# Patient Record
Sex: FEMALE | Race: AMERICAN INDIAN OR ALASKA NATIVE | NOT HISPANIC OR LATINO | URBAN - METROPOLITAN AREA
[De-identification: names, ages, dates, MRNs, and addresses within clinical notes are randomized per-mention and may not be internally consistent; named-entity substitution may affect disease eponyms.]

---

## 2017-08-25 ENCOUNTER — OUTPATIENT (OUTPATIENT)
Dept: OUTPATIENT SERVICES | Facility: HOSPITAL | Age: 42
LOS: 1 days | Discharge: HOME | End: 2017-08-25

## 2017-08-25 DIAGNOSIS — M67.432 GANGLION, LEFT WRIST: ICD-10-CM

## 2017-08-25 DIAGNOSIS — Z01.818 ENCOUNTER FOR OTHER PREPROCEDURAL EXAMINATION: ICD-10-CM

## 2017-08-31 ENCOUNTER — OUTPATIENT (OUTPATIENT)
Dept: OUTPATIENT SERVICES | Facility: HOSPITAL | Age: 42
LOS: 1 days | Discharge: HOME | End: 2017-08-31

## 2017-09-05 DIAGNOSIS — M67.432 GANGLION, LEFT WRIST: ICD-10-CM

## 2018-07-05 ENCOUNTER — EMERGENCY (EMERGENCY)
Facility: HOSPITAL | Age: 43
LOS: 0 days | Discharge: HOME | End: 2018-07-05
Attending: EMERGENCY MEDICINE | Admitting: EMERGENCY MEDICINE

## 2018-07-05 VITALS
SYSTOLIC BLOOD PRESSURE: 168 MMHG | TEMPERATURE: 98 F | RESPIRATION RATE: 16 BRPM | DIASTOLIC BLOOD PRESSURE: 98 MMHG | OXYGEN SATURATION: 100 % | HEART RATE: 83 BPM

## 2018-07-05 VITALS
HEIGHT: 66 IN | OXYGEN SATURATION: 100 % | RESPIRATION RATE: 17 BRPM | WEIGHT: 175.05 LBS | TEMPERATURE: 99 F | HEART RATE: 92 BPM | SYSTOLIC BLOOD PRESSURE: 175 MMHG | DIASTOLIC BLOOD PRESSURE: 102 MMHG
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
## 2018-07-05 DIAGNOSIS — M54.9 DORSALGIA, UNSPECIFIED: ICD-10-CM
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
## 2018-07-05 DIAGNOSIS — M54.5 LOW BACK PAIN: ICD-10-CM

## 2018-07-05 RX ORDER — IBUPROFEN 200 MG
600 TABLET ORAL ONCE
Qty: 0 | Refills: 0 | Status: COMPLETED | OUTPATIENT
Start: 2018-07-05 | End: 2018-07-05

## 2018-07-05 RX ADMIN — Medication 600 MILLIGRAM(S): at 21:06
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
## 2018-07-05 NOTE — ED PROVIDER NOTE - NS ED ROS FT
Review of Systems    Constitutional: (-) fever or chills  respiratory: (-) cough (-) shortness of breath  Cardiovascular: (-) syncope, palpitations or chest pain  Integumentary: (-) rash or painful lymph nodes  Neurological: (-) altered mental status, headache or head injury  msk: no cervical or vertebral tenderness/ good rom flexion and extension of back / no cva tenderness
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
## 2019-02-01 ENCOUNTER — OUTPATIENT (OUTPATIENT)
Dept: OUTPATIENT SERVICES | Facility: HOSPITAL | Age: 44
LOS: 1 days | Discharge: HOME | End: 2019-02-01

## 2019-02-01 DIAGNOSIS — Z00.00 ENCOUNTER FOR GENERAL ADULT MEDICAL EXAMINATION WITHOUT ABNORMAL FINDINGS: ICD-10-CM

## 2019-07-09 ENCOUNTER — EMERGENCY (EMERGENCY)
Facility: HOSPITAL | Age: 44
LOS: 0 days | Discharge: HOME | End: 2019-07-09
Attending: EMERGENCY MEDICINE | Admitting: EMERGENCY MEDICINE
Payer: COMMERCIAL

## 2019-07-09 VITALS
DIASTOLIC BLOOD PRESSURE: 117 MMHG | SYSTOLIC BLOOD PRESSURE: 193 MMHG | HEART RATE: 82 BPM | HEIGHT: 66 IN | WEIGHT: 156.09 LBS | TEMPERATURE: 96 F | OXYGEN SATURATION: 100 %

## 2019-07-09 VITALS
RESPIRATION RATE: 18 BRPM | HEART RATE: 78 BPM | SYSTOLIC BLOOD PRESSURE: 159 MMHG | OXYGEN SATURATION: 99 % | DIASTOLIC BLOOD PRESSURE: 92 MMHG

## 2019-07-09 DIAGNOSIS — R42 DIZZINESS AND GIDDINESS: ICD-10-CM

## 2019-07-09 DIAGNOSIS — F41.9 ANXIETY DISORDER, UNSPECIFIED: ICD-10-CM

## 2019-07-09 LAB
ALBUMIN SERPL ELPH-MCNC: 4.5 G/DL — SIGNIFICANT CHANGE UP (ref 3.5–5.2)
ALP SERPL-CCNC: 51 U/L — SIGNIFICANT CHANGE UP (ref 30–115)
ALT FLD-CCNC: 10 U/L — SIGNIFICANT CHANGE UP (ref 0–41)
ANION GAP SERPL CALC-SCNC: 6 MMOL/L — LOW (ref 7–14)
APAP SERPL-MCNC: <5 UG/ML — LOW (ref 10–30)
APPEARANCE UR: CLEAR — SIGNIFICANT CHANGE UP
AST SERPL-CCNC: 15 U/L — SIGNIFICANT CHANGE UP (ref 0–41)
BASOPHILS # BLD AUTO: 0.06 K/UL — SIGNIFICANT CHANGE UP (ref 0–0.2)
BASOPHILS NFR BLD AUTO: 1.1 % — HIGH (ref 0–1)
BILIRUB SERPL-MCNC: 0.5 MG/DL — SIGNIFICANT CHANGE UP (ref 0.2–1.2)
BILIRUB UR-MCNC: NEGATIVE — SIGNIFICANT CHANGE UP
BUN SERPL-MCNC: 9 MG/DL — LOW (ref 10–20)
CALCIUM SERPL-MCNC: 9.1 MG/DL — SIGNIFICANT CHANGE UP (ref 8.5–10.1)
CHLORIDE SERPL-SCNC: 117 MMOL/L — HIGH (ref 98–110)
CO2 SERPL-SCNC: 26 MMOL/L — SIGNIFICANT CHANGE UP (ref 17–32)
COLOR SPEC: YELLOW — SIGNIFICANT CHANGE UP
CREAT SERPL-MCNC: 0.7 MG/DL — SIGNIFICANT CHANGE UP (ref 0.7–1.5)
DIFF PNL FLD: NEGATIVE — SIGNIFICANT CHANGE UP
EOSINOPHIL # BLD AUTO: 0.03 K/UL — SIGNIFICANT CHANGE UP (ref 0–0.7)
EOSINOPHIL NFR BLD AUTO: 0.5 % — SIGNIFICANT CHANGE UP (ref 0–8)
ETHANOL SERPL-MCNC: <10 MG/DL — SIGNIFICANT CHANGE UP
GLUCOSE SERPL-MCNC: 118 MG/DL — HIGH (ref 70–99)
GLUCOSE UR QL: NEGATIVE MG/DL — SIGNIFICANT CHANGE UP
HCT VFR BLD CALC: 41.4 % — SIGNIFICANT CHANGE UP (ref 37–47)
HGB BLD-MCNC: 13.5 G/DL — SIGNIFICANT CHANGE UP (ref 12–16)
IMM GRANULOCYTES NFR BLD AUTO: 0.4 % — HIGH (ref 0.1–0.3)
KETONES UR-MCNC: NEGATIVE — SIGNIFICANT CHANGE UP
LEUKOCYTE ESTERASE UR-ACNC: NEGATIVE — SIGNIFICANT CHANGE UP
LYMPHOCYTES # BLD AUTO: 1.41 K/UL — SIGNIFICANT CHANGE UP (ref 1.2–3.4)
LYMPHOCYTES # BLD AUTO: 25.8 % — SIGNIFICANT CHANGE UP (ref 20.5–51.1)
MCHC RBC-ENTMCNC: 30.1 PG — SIGNIFICANT CHANGE UP (ref 27–31)
MCHC RBC-ENTMCNC: 32.6 G/DL — SIGNIFICANT CHANGE UP (ref 32–37)
MCV RBC AUTO: 92.2 FL — SIGNIFICANT CHANGE UP (ref 81–99)
MONOCYTES # BLD AUTO: 0.33 K/UL — SIGNIFICANT CHANGE UP (ref 0.1–0.6)
MONOCYTES NFR BLD AUTO: 6 % — SIGNIFICANT CHANGE UP (ref 1.7–9.3)
NEUTROPHILS # BLD AUTO: 3.61 K/UL — SIGNIFICANT CHANGE UP (ref 1.4–6.5)
NEUTROPHILS NFR BLD AUTO: 66.2 % — SIGNIFICANT CHANGE UP (ref 42.2–75.2)
NITRITE UR-MCNC: NEGATIVE — SIGNIFICANT CHANGE UP
NRBC # BLD: 0 /100 WBCS — SIGNIFICANT CHANGE UP (ref 0–0)
PH UR: 5.5 — SIGNIFICANT CHANGE UP (ref 5–8)
PLATELET # BLD AUTO: 170 K/UL — SIGNIFICANT CHANGE UP (ref 130–400)
POTASSIUM SERPL-MCNC: 4.8 MMOL/L — SIGNIFICANT CHANGE UP (ref 3.5–5)
POTASSIUM SERPL-SCNC: 4.8 MMOL/L — SIGNIFICANT CHANGE UP (ref 3.5–5)
PROT SERPL-MCNC: 6.6 G/DL — SIGNIFICANT CHANGE UP (ref 6–8)
PROT UR-MCNC: NEGATIVE MG/DL — SIGNIFICANT CHANGE UP
RBC # BLD: 4.49 M/UL — SIGNIFICANT CHANGE UP (ref 4.2–5.4)
RBC # FLD: 11.8 % — SIGNIFICANT CHANGE UP (ref 11.5–14.5)
SALICYLATES SERPL-MCNC: <0.3 MG/DL — LOW (ref 4–30)
SODIUM SERPL-SCNC: 149 MMOL/L — HIGH (ref 135–146)
SP GR SPEC: 1.02 — SIGNIFICANT CHANGE UP (ref 1.01–1.03)
UROBILINOGEN FLD QL: 0.2 MG/DL — SIGNIFICANT CHANGE UP (ref 0.2–0.2)
WBC # BLD: 5.46 K/UL — SIGNIFICANT CHANGE UP (ref 4.8–10.8)
WBC # FLD AUTO: 5.46 K/UL — SIGNIFICANT CHANGE UP (ref 4.8–10.8)

## 2019-07-09 PROCEDURE — 99284 EMERGENCY DEPT VISIT MOD MDM: CPT

## 2019-07-09 RX ORDER — HYDROXYZINE HCL 10 MG
1 TABLET ORAL
Qty: 28 | Refills: 0
Start: 2019-07-09 | End: 2019-07-15

## 2019-07-09 RX ORDER — HYDROXYZINE HCL 10 MG
25 TABLET ORAL ONCE
Refills: 0 | Status: COMPLETED | OUTPATIENT
Start: 2019-07-09 | End: 2019-07-09

## 2019-07-09 RX ORDER — SODIUM CHLORIDE 9 MG/ML
1000 INJECTION, SOLUTION INTRAVENOUS ONCE
Refills: 0 | Status: COMPLETED | OUTPATIENT
Start: 2019-07-09 | End: 2019-07-09

## 2019-07-09 RX ORDER — MECLIZINE HCL 12.5 MG
25 TABLET ORAL ONCE
Refills: 0 | Status: DISCONTINUED | OUTPATIENT
Start: 2019-07-09 | End: 2019-07-09

## 2019-07-09 RX ADMIN — SODIUM CHLORIDE 1000 MILLILITER(S): 9 INJECTION, SOLUTION INTRAVENOUS at 14:10

## 2019-07-09 RX ADMIN — Medication 25 MILLIGRAM(S): at 14:11

## 2019-07-09 NOTE — ED PROVIDER NOTE - CLINICAL SUMMARY MEDICAL DECISION MAKING FREE TEXT BOX
44y female with dizziness as above, PE significant only for hypertension and tense anxious affect, BP improved without medication (other than vistaril), labs and studies reviewed and consult appreciated, will d/c to f/u with Dr Dueñas. Patient counseled regarding conditions which should prompt return.

## 2019-07-09 NOTE — ED PROVIDER NOTE - OBJECTIVE STATEMENT
The pt is a 44y Female with no significant PMH is presenting to ED with lightheadedness x 1 day. Pt states she woke up this am and felt dizzy with intermittent lightheadedness. Aggravated with movements, relieved with rest. She states she finished lunch today and acutely became lightheaded when she went to stand up. Associated with slight throbbing frontal HA that is gradual in onset. Currently denies HA. She denies syncope, head trauma, f/c/n/v/d, abd pain, cp, sob, palpitations, tinnitus, ear pain, URI symptoms, urinary changes, visual changes. LMP 3 wks ago. The pt is a 44y Female with no significant PMH is presenting to ED with lightheadedness x 1 day. Pt states she woke up this am and felt dizzy with intermittent lightheadedness. Aggravated with movements, relieved with rest. She states she finished lunch today and acutely became lightheaded when she went to stand up. Associated with slight throbbing frontal HA that is gradual in onset. Currently denies HA. She denies syncope, head trauma, f/c/n/v/d, abd pain, cp, sob, palpitations, tinnitus, ear pain, URI symptoms, urinary changes, visual changes. LMP 3 wks ago. Pt states she is under a lot of stress at home and hasn't been sleeping well. She denies SI, HI. pt reports feeling safe at home.

## 2019-07-09 NOTE — ED PROVIDER NOTE - NSFOLLOWUPCLINICS_GEN_ALL_ED_FT
[facility]  [facility]  [address] [place] [id number]  Phone: [phone]  Fax:   Follow Up Time:

## 2019-07-09 NOTE — ED PROVIDER NOTE - NSFOLLOWUPINSTRUCTIONS_ED_ALL_ED_FT
Dizziness    Dizziness can manifest as a feeling of unsteadiness or light-headedness. You may feel like you are about to faint. This condition can be caused by a number of things, including medicines, dehydration, or illness. Drink enough fluid to keep your urine clear or pale yellow. Do not drink alcohol and limit your caffeine intake. Avoid quick or sudden movements.  Rise slowly from chairs and steady yourself until you feel okay. In the morning, first sit up on the side of the bed.    SEEK IMMEDIATE MEDICAL CARE IF YOU HAVE ANY OF THE FOLLOWING SYMPTOMS: vomiting, changes in your vision or speech, weakness in your arms or legs, trouble speaking or swallowing, chest pain, abdominal pain, shortness of breath, sweating, bleeding, headache, neck pain, or fever.    Anxiety    Generalized anxiety disorder (FAISAL) is a mental disorder. It is defined as anxiety that is not necessarily related to specific events or activities or is out of proportion to said events. Symptoms include restlessness, fatigue, difficulty concentrations, irritability and difficulty concentrating. It may interfere with life functions, including relationships, work, and school. If you were started on a medication, make sure to take exactly as prescribed and follow up with a psychiatrist.    SEEK IMMEDIATE MEDICAL CARE IF YOU HAVE ANY OF THE FOLLOWING SYMPTOMS: thoughts about hurting killing yourself, thoughts about hurting or killing somebody else, hallucinations, or worsening depression.

## 2019-07-09 NOTE — ED PROVIDER NOTE - PROGRESS NOTE DETAILS
Pt states she is feeling better, but states she has some thoughts of wanting to harm herself, but states she does not want to. Psych now consulted. Psych will come and evaluate pt. PT states she I feeling better and ready for dc. Pt seen by psych, no need for admission, pt denying SI. outpatient psych given for fu. Hydroxyzine sent to pharmacy. lab results and copy given to pt. pt to follow up with PMD. pt eager for dc.

## 2019-07-09 NOTE — ED PROVIDER NOTE - NS ED ROS FT
GEN: (-) fever, (-) chills, (-) malaise, (-) decreased appetite  HEENT: (-) vision changes, (-) HA, (-) sore throat, (-) ear pain(-) tinnitus   CV: (-) chest pain, (-) palpitations, (-) edema  PULM: (-) cough, (-) wheezing, (-) dyspnea, (-) orthopnea, (-) hemoptysis   GI: (-) abdominal pain,(-) Nausea, (-) Vomiting, (-) Diarrhea  NEURO: (+) lightheadedness, (+) dizziness, (-) weakness, (-) paresthesias, (-) syncope  : (-) dysuria, (-) frequency, (-) urgency, (-) hematuria  MS: (-) back pain, (-) joint pain, (-)myalgias, (-) swelling  SKIN: (-) rashes, (-) new lesions, (-) pruritus, (-) jaundice  HEME: (-) bleeding, (-) ecchymosis  PSYCHL: (-) SI, (-) HI, (+) anxiety

## 2019-07-09 NOTE — ED ADULT NURSE NOTE - NSIMPLEMENTINTERV_GEN_ALL_ED
Implemented All Fall with Harm Risk Interventions:  [place] to call system. Call bell, personal items and telephone within reach. Instruct patient to call for assistance. Room bathroom lighting operational. Non-slip footwear when patient is off stretcher. Physically safe environment: no spills, clutter or unnecessary equipment. Stretcher in lowest position, wheels locked, appropriate side rails in place. Provide visual cue, wrist band, yellow gown, etc. Monitor gait and stability. Monitor for mental status changes and reorient to person, place, and time. Review medications for side effects contributing to fall risk. Reinforce activity limits and safety measures with patient and family. Provide visual clues: red socks.

## 2019-07-09 NOTE — ED PROVIDER NOTE - INTERPRETATION
normal sinus rhythm, Normal axis, Normal AL interval and QRS complex. There are no acute ischemic ST or T-wave changes./sinus luisito
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
## 2019-07-09 NOTE — ED PROVIDER NOTE - PHYSICAL EXAMINATION
GEN: Alert & Oriented x 3, No acute distress. Calm, appropriate. Pt appears anxious  HEENT: Normocephalic, atraumatic. dry mucous membranes.  Eyes: PERRL. EOMI. No nystagmus. No conjunctival injection. No scleral icterus.   RESP: Lungs clear to auscult bilat. no wheezes, rhonchi or rales. No retractions. Equal air entry.  CARDIO: regular rate and rhythm, no murmurs, rubs or gallops. Normal S1, S2. Radial pulses 2+ bilaterally. No lower extremity edema.  ABD: Soft, Nondistended. No rebound tenderness/guarding. No pulsatile mass. No tenderness with palpation x 4 quadrants.   MS: Full ROM of extremities. No midline tenderness throughout.   SKIN: no rashes/lesions, no petechiae, no ecchymosis.  Neuro: A&O x3, CN II- XII intact, strength 5/5 throughout extremities, sensation intact. Speech & mentation intact. No drooping of eye or mouth. Without dysarthria or aphasia. Finger to nose intact. Romberg Neg. Neg. nuchal rigidity. Coordinated gait.   PSYCH: Pt appears anxious. Pt denies homicidal ideation, suicidal ideation.

## 2019-12-14 ENCOUNTER — EMERGENCY (EMERGENCY)
Facility: HOSPITAL | Age: 44
LOS: 0 days | Discharge: HOME | End: 2019-12-14
Attending: EMERGENCY MEDICINE | Admitting: EMERGENCY MEDICINE
Payer: COMMERCIAL

## 2019-12-14 VITALS
TEMPERATURE: 99 F | HEART RATE: 92 BPM | RESPIRATION RATE: 16 BRPM | WEIGHT: 160.06 LBS | DIASTOLIC BLOOD PRESSURE: 89 MMHG | OXYGEN SATURATION: 96 % | SYSTOLIC BLOOD PRESSURE: 151 MMHG

## 2019-12-14 DIAGNOSIS — H57.10 OCULAR PAIN, UNSPECIFIED EYE: ICD-10-CM
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
## 2019-12-14 DIAGNOSIS — S05.01XA INJURY OF CONJUNCTIVA AND CORNEAL ABRASION WITHOUT FOREIGN BODY, RIGHT EYE, INITIAL ENCOUNTER: ICD-10-CM

## 2019-12-14 PROCEDURE — 99283 EMERGENCY DEPT VISIT LOW MDM: CPT

## 2019-12-14 RX ORDER — POLYMYXIN B SULF/TRIMETHOPRIM 10000-1/ML
1 DROPS OPHTHALMIC (EYE)
Qty: 10 | Refills: 0
Start: 2019-12-14 | End: 2019-12-20

## 2019-12-14 RX ORDER — CIPROFLOXACIN HCL 0.3 %
2 DROPS OPHTHALMIC (EYE)
Qty: 10 | Refills: 0
Start: 2019-12-14 | End: 2019-12-18

## 2019-12-14 NOTE — ED PROVIDER NOTE - CLINICAL SUMMARY MEDICAL DECISION MAKING FREE TEXT BOX
+ corneal abrasion.  No other signs of any more serious issue. Education given.     Full DC instructions discussed and patient knows when to seek immediate medical attention.  Patient has proper follow up. Limitations of ED work up discussed.  Medications administered and prescribed/OTC home meds discussed.  All questions and concerns from patient or family addressed. Understanding of instructions verbalized.
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
## 2019-12-14 NOTE — ED ADULT NURSE NOTE - NSIMPLEMENTINTERV_GEN_ALL_ED
Implemented All Universal Safety Interventions:  [place] to call system. Call bell, personal items and telephone within reach. Instruct patient to call for assistance. Room bathroom lighting operational. Non-slip footwear when patient is off stretcher. Physically safe environment: no spills, clutter or unnecessary equipment. Stretcher in lowest position, wheels locked, appropriate side rails in place.
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
## 2019-12-14 NOTE — ED PROVIDER NOTE - PATIENT PORTAL LINK FT
You can access the FollowMyHealth Patient Portal offered by [facility] by registering at the following website: [email or web address]. By joining [employer]’s FollowMyHealth portal, you will also be able to view your health information using other applications (apps) compatible with our system.

## 2019-12-14 NOTE — ED PROVIDER NOTE - ATTENDING CONTRIBUTION TO CARE
I personally evaluated patient. I agree with the findings and plan with all documentation on chart except as documented  in my note.    + corneal abrasion.  No other signs of any more serious issue. Education given.     Full DC instructions discussed and patient knows when to seek immediate medical attention.  Patient has proper follow up. Limitations of ED work up discussed.  Medications administered and prescribed/OTC home meds discussed.  All questions and concerns from patient or family addressed. Understanding of instructions verbalized.

## 2020-04-26 ENCOUNTER — MESSAGE (OUTPATIENT)
Age: 45
End: 2020-04-26

## 2020-05-06 ENCOUNTER — APPOINTMENT (OUTPATIENT)
Dept: DISASTER EMERGENCY | Facility: HOSPITAL | Age: 45
End: 2020-05-06

## 2020-05-09 LAB
SARS-COV-2 IGG SERPL IA-ACNC: <0.1 INDEX
SARS-COV-2 IGG SERPL QL IA: NEGATIVE

## 2021-11-04 ENCOUNTER — OUTPATIENT (OUTPATIENT)
Dept: OUTPATIENT SERVICES | Facility: HOSPITAL | Age: 46
LOS: 1 days | Discharge: HOME | End: 2021-11-04
Payer: COMMERCIAL

## 2021-11-04 DIAGNOSIS — M79.642 PAIN IN LEFT HAND: ICD-10-CM

## 2021-11-04 PROCEDURE — 73120 X-RAY EXAM OF HAND: CPT | Mod: 26,LT

## 2022-07-04 ENCOUNTER — EMERGENCY (EMERGENCY)
Facility: HOSPITAL | Age: 47
LOS: 0 days | Discharge: HOME | End: 2022-07-04
Attending: EMERGENCY MEDICINE | Admitting: EMERGENCY MEDICINE

## 2022-07-04 VITALS
RESPIRATION RATE: 18 BRPM | TEMPERATURE: 98 F | DIASTOLIC BLOOD PRESSURE: 84 MMHG | OXYGEN SATURATION: 99 % | HEART RATE: 73 BPM | SYSTOLIC BLOOD PRESSURE: 137 MMHG

## 2022-07-04 VITALS — HEIGHT: 66 IN

## 2022-07-04 DIAGNOSIS — M25.471 EFFUSION, RIGHT ANKLE: ICD-10-CM
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
## 2022-07-04 DIAGNOSIS — M25.571 PAIN IN RIGHT ANKLE AND JOINTS OF RIGHT FOOT: ICD-10-CM
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
## 2022-07-04 DIAGNOSIS — S92.151A DISPLACED AVULSION FRACTURE (CHIP FRACTURE) OF RIGHT TALUS, INITIAL ENCOUNTER FOR CLOSED FRACTURE: ICD-10-CM

## 2022-07-04 PROCEDURE — 29515 APPLICATION SHORT LEG SPLINT: CPT

## 2022-07-04 PROCEDURE — 73630 X-RAY EXAM OF FOOT: CPT | Mod: 26,RT

## 2022-07-04 PROCEDURE — 73610 X-RAY EXAM OF ANKLE: CPT | Mod: 26,RT

## 2022-07-04 PROCEDURE — 99284 EMERGENCY DEPT VISIT MOD MDM: CPT | Mod: 25

## 2022-07-04 RX ORDER — IBUPROFEN 200 MG
600 TABLET ORAL ONCE
Refills: 0 | Status: COMPLETED | OUTPATIENT
Start: 2022-07-04 | End: 2022-07-04

## 2022-07-04 RX ADMIN — Medication 600 MILLIGRAM(S): at 08:32

## 2022-07-04 NOTE — ED PROVIDER NOTE - CARE PROVIDER_API CALL
Royal Irizarry (MD)  Orthopaedic Surgery  [address] [place] [address]  Phone: [phone]  Fax: [phone]  Follow Up Time: Urgent

## 2022-07-04 NOTE — ED PROVIDER NOTE - NS ED ROS FT
Constitutional:  No fevers or chills.  Eyes:  No visual changes, eye pain, or discharge.  ENT:  No hearing changes. No sore throat.  Neck:  No neck pain or stiffness.  Cardiac:  No CP or edema.  Resp:  No cough or SOB. No hemoptysis.   GI:  No nausea, vomiting, diarrhea, or abdominal pain.  :  No dysuria, frequency, or hematuria.  MSK:(+) ankle pain  No myalgias or joint pain/swelling.  Neuro:  No headache, dizziness, or weakness.  Skin:  No skin rash.

## 2022-07-04 NOTE — ED PROVIDER NOTE - PATIENT PORTAL LINK FT
You can access the FollowMyHealth Patient Portal offered by [facility] by registering at the following website: [email or web address]. By joining [employer]’s FollowMyHealth portal, you will also be able to view your health information using other applications (apps) compatible with our system.
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
## 2022-07-04 NOTE — ED PROVIDER NOTE - ADDITIONAL NOTES AND INSTRUCTIONS:
This patient has an ankle fracture she is to remain in splint nonweightbearing on crutches until evaluated by orthopedic surgeon.

## 2022-07-04 NOTE — ED PROVIDER NOTE - NSFOLLOWUPINSTRUCTIONS_ED_ALL_ED_FT
Our Emergency Department Referral Coordinators will be reaching out to you in the next 24-48 hours from 9:00am to 5:00pm (Monday - Friday) with a follow up appointment. Please expect a phone call from the hospital in that time frame. If you do not receive a call or if you have any questions or concerns, you can reach them at [phone] or [phone].      Ankle Fracture    The ankle joint is made up of the lower (distal) sections of your lower leg bones(tibia and fibula) along with a bone in your foot (talus). An ankle fracture is a break in one, two, or all three of these sections of bone. There are two general types of ankle fractures:  Stable fracture. This happens when one of your bones is broken, but the bones of your ankle joint stay in their normal positions.  Unstable fracture. This type can include more than one broken bone. It can also happen if your outer bone is broken and the tough bands of tissue that connect bones (ligaments)are also injured at your inner ankle. This type of fracture allows the talus to move out of its normal position.  What are the causes?  This condition may be caused by:  A hard, direct hit (blow) to the ankle.  Quickly and severely twisting your ankle, often while your foot is planted and the rest of your body moving.  Trauma, such as a car accident or falling from a height.  What increases the risk?  This condition is more likely to occur in people who:  Smoke.  Are overweight.  Participate in sports that involve quick direction changes, as in soccer.  Do high-impact sports like gymnastics or football.  Are involved in a high-impact car accident.  What are the signs or symptoms?  ImageSymptoms of this condition include:  Tender and swollen ankle.  Bruising around the injured ankle.  Pain when moving or pressing on the ankle.  Trouble walking or using the ankle to support your body weight (putting weight on the ankle).  Pain that gets worse when moving or standing and gets better with rest.  How is this diagnosed?  An ankle fracture is usually diagnosed with a physical exam and X-rays. A CT scan or MRI may also be done.    How is this treated?  Treatment for this condition depends on the type of ankle fracture you have. Stable fractures are treated with a cast, boot, or splint to hold the ankle still and crutches to avoid putting weight on the injured ankle until the fracture heals. Unstable fractures require surgery to ensure that the bones heal properly. After surgery, you will have a splint. After your incision is healed, your surgeon may give you a cast or a boot. You will not be able to put weight on your injured side for several weeks.    After your ankle has healed, you will do exercises to improve the strength and mobility of your ankle.    Follow these instructions at home:  If you have a splint:     Wear the splint as told by your health care provider. Remove it only as told by your health care provider.  Loosen the splint if your toes tingle, become numb, or turn cold and blue.  Keep the splint clean.  If the splint is not waterproof:  Do not let it get wet.  Cover it with a watertight covering when you take a bath or a shower.  If you have a cast:     Do not stick anything inside the cast to scratch your skin. Doing that increases your risk of infection.  Check the skin around the cast every day. Tell your health care provider about any concerns.  You may put lotion on dry skin around the edges of the cast. Do not put lotion on the skin underneath the cast.  Keep the cast clean.  If the cast is not waterproof:  Do not let it get wet.  Cover it with a watertight covering when you take a bath or a shower.  Managing pain, stiffness, and swelling     If directed, put ice on the injured area:  If you have a removable splint, remove it as told by your health care provider.  Put ice in a plastic bag.  Place a towel between your skin and the bag or between your cast and the bag.  Leave the ice on for 20 minutes, 2–3 times a day.  Move your toes often to avoid stiffness and to lessen swelling.  Raise (elevate) the injured area above the level of your heart while you are sitting or lying down.  General instructions     Do not use the injured limb to support your body weight until your health care provider says that you can. Use crutches as told by your health care provider  Take over-the-counter and prescription medicines only as told by your health care provider.  Ask your health care provider when it is safe to drive if you have a cast or splint.  Do exercises as told by your health care provider.  Do not use any products that contain nicotine or tobacco, such as cigarettes and e-cigarettes. These can delay bone healing. If you need help quitting, ask your health care provider  Keep all follow-up visits as told by your health care provider. This is important.  Contact a health care provider if:  You have pain or swelling that gets worse or does not get better with rest or medicine.  Get help right away if:  Your cast gets damaged.  You have severe pain that lasts.  You develop new pain or swelling.  Your skin or toenails below the injury turn blue or gray, feel cold, become numb, or have a loss of sensitivity to touch.  Summary  An ankle fracture can either be stable or unstable. This is determined after a physical exam and imaging studies like X-rays, a CT scan, or MRI.  Stable fractures are treated with a cast, boot, or splint to hold the ankle still until the fracture heals. Unstable fractures require surgery to ensure that the bones heal properly.  You will not be able to put weight on your injured side for several weeks.  Pain medicines, icing, and raising (elevating) your injured ankle when sitting or lying down may help with pain relief. Follow instructions as told by your health care provider.  This information is not intended to replace advice given to you by your health care provider. Make sure you discuss any questions you have with your health care provider.

## 2022-07-04 NOTE — ED PROVIDER NOTE - PHYSICAL EXAMINATION
PHYSICAL EXAM: I have reviewed current vital signs.  GENERAL: NAD, well-nourished; well-developed.  HEAD:  Normocephalic, atraumatic.  EYES: EOMI, PERRL, conjunctiva and sclera clear.  ENT: MMM, no erythema/exudates.  NECK: Supple, no JVD.  CHEST/LUNG: Clear to auscultation bilaterally; no wheezes, rales, or rhonchi.  HEART: Regular rate and rhythm, normal S1 and S2; no murmurs, rubs, or gallops.  ABDOMEN: Soft, nontender, nondistended.  EXTREMITIES: Patient has swelling and tenderness to right lateral malleolus. 2+ peripheral pulses; no clubbing, cyanosis.  PSYCH: Cooperative, appropriate, normal mood and affect.  NEUROLOGY: A&O x 3. Motor 5/5. Sensory intact. No focal neurological deficits. CN II - XII intact. (-) dysmetria, facial droop, pronator drift.  SKIN: Warm and dry.

## 2022-07-04 NOTE — ED PROVIDER NOTE - CLINICAL SUMMARY MEDICAL DECISION MAKING FREE TEXT BOX
X-ray confirms fracture.  Patient's extremity is splinted.  Nonweightbearing is ordered.  Crutches are provided.  Patient requires Ortho follow-up.

## 2022-07-04 NOTE — ED PROVIDER NOTE - ATTENDING CONTRIBUTION TO CARE
Patient complains of ankle pain.  She twisted it this morning.  She has difficulty ambulating because of the pain.  Vital signs noted.  There is moderate lateral swelling of the ankle.  There is tenderness of the lateral ankle especially over the fibula.  There is mild tenderness but no swelling over the deltoid ligament medially.  There is no drawer sign.  Distal neurovascular function is intact.  There is no tenderness of the proximal fibula.  Range of motion of the ankle is painful.  X-ray ordered.

## 2022-07-04 NOTE — ED PROVIDER NOTE - PROGRESS NOTE DETAILS
kondrat- Avulsion fracture of talus noted on x-ray.  Patient informed of result will apply posterior splint and refer to orthopedics.

## 2022-07-05 PROBLEM — Z00.00 ENCOUNTER FOR PREVENTIVE HEALTH EXAMINATION: Status: ACTIVE | Noted: 2022-07-05

## 2022-07-06 ENCOUNTER — APPOINTMENT (OUTPATIENT)
Dept: PODIATRY | Facility: CLINIC | Age: 47
End: 2022-07-06

## 2022-07-06 ENCOUNTER — OUTPATIENT (OUTPATIENT)
Dept: OUTPATIENT SERVICES | Facility: HOSPITAL | Age: 47
LOS: 1 days | Discharge: HOME | End: 2022-07-06

## 2022-07-06 VITALS — BODY MASS INDEX: 28.32 KG/M2 | WEIGHT: 170 LBS | HEIGHT: 65 IN

## 2022-07-06 PROCEDURE — 99203 OFFICE O/P NEW LOW 30 MIN: CPT | Mod: 25

## 2022-07-06 PROCEDURE — 29515 APPLICATION SHORT LEG SPLINT: CPT | Mod: 58

## 2022-07-06 NOTE — REASON FOR VISIT
[Follow-Up Visit] : a follow-up visit [TextBox_50] : post right orchiopexy [Initial Visit] : an initial visit for [Confirmed Foot Fracture] : confirmed foot fracture

## 2022-07-06 NOTE — ASSESSMENT
[Verbal] : verbal [Patient] : patient [Good - alert, interested, motivated] : Good - alert, interested, motivated [FreeTextEntry1] : Assessment:\par -Talus avulsion fx, Right foot\par \par -Plan:\par -Pt seen & evaluated\par -Dx and Tx explained w/patient\par -Right LE dressed w/Webril / Posterior Splint / ACE\par -WBAT  in CAM boot\par -Rx CAM boot\par -FXR-R, AXR-R \par -Ibuprofen PRN pain\par -RICE\par -Work note given for 5 weeks \par -RTC 5 wks, review XR [Demonstrates independently] : demonstrates independently [Foot Care] : foot care

## 2022-07-06 NOTE — HISTORY OF PRESENT ILLNESS
[Other: ____] : [unfilled] [Crutches] : crutches [FreeTextEntry1] : CC: "Foot fracture"\par HPI:\par -47F c/o R foot fx onset 2d ago. States she was standing on her deck, Left leg fell into a hole, and she twisted Right foot as a result.\par -Reports minor pain Right foot. \par -Dressings D/C/I\par - Pain getting better since the injury \par - Complains about mild numbness and tingling \par -No other pedal complaints

## 2022-07-06 NOTE — PHYSICAL EXAM
[General Appearance - Alert] : alert [General Appearance - In No Acute Distress] : in no acute distress [General Appearance - Well Nourished] : well nourished [General Appearance - Well Developed] : well developed [Ankle Swelling On The Right] : of the right ankle [Varicose Veins Of The Right Leg] : on the right foot/ankle [2+] : right foot dorsalis pedis 2+ [Skin Lesions] : no skin lesions [Sensation] : the sensory exam was normal to light touch and pinprick [Oriented To Time, Place, And Person] : oriented to person, place, and time [Impaired Insight] : insight and judgment were intact [Affect] : the affect was normal [Ankle Swelling (On Exam)] : not present [Varicose Veins Of Lower Extremities] : not present [] : not present [Delayed in the Right Toes] : capillary refills normal in right toes [de-identified] : No gross skeletal deformities\par R Ankle ROM WNL \par Pain on palpation of the medial dorsal talar head R foot. painful dorsiflexion and plantarflexion against resistance R foot\par TTP dorsomedial TN joint [Foot Ulcer] : no foot ulcer [Skin Induration] : no skin induration [FreeTextEntry1] : Ecchymosis & edema dorsomedial midfoot\par No open wounds/lesions

## 2022-07-27 ENCOUNTER — APPOINTMENT (OUTPATIENT)
Dept: ORTHOPEDIC SURGERY | Facility: CLINIC | Age: 47
End: 2022-07-27

## 2022-07-27 VITALS — BODY MASS INDEX: 27.32 KG/M2 | HEIGHT: 66 IN | WEIGHT: 170 LBS

## 2022-07-27 DIAGNOSIS — S99.921A UNSPECIFIED INJURY OF RIGHT FOOT, INITIAL ENCOUNTER: ICD-10-CM

## 2022-07-27 DIAGNOSIS — Z78.9 OTHER SPECIFIED HEALTH STATUS: ICD-10-CM

## 2022-07-27 DIAGNOSIS — S89.91XA UNSPECIFIED INJURY OF RIGHT LOWER LEG, INITIAL ENCOUNTER: ICD-10-CM

## 2022-07-27 DIAGNOSIS — S89.92XA UNSPECIFIED INJURY OF LEFT LOWER LEG, INITIAL ENCOUNTER: ICD-10-CM

## 2022-07-27 PROCEDURE — 99203 OFFICE O/P NEW LOW 30 MIN: CPT

## 2022-07-27 PROCEDURE — 73630 X-RAY EXAM OF FOOT: CPT | Mod: RT

## 2022-07-27 PROCEDURE — 73562 X-RAY EXAM OF KNEE 3: CPT | Mod: RT

## 2022-07-27 RX ORDER — DICLOFENAC SODIUM 75 MG/1
75 TABLET, DELAYED RELEASE ORAL
Qty: 60 | Refills: 1 | Status: ACTIVE | COMMUNITY
Start: 2022-07-27 | End: 1900-01-01
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
## 2022-07-27 NOTE — IMAGING
[de-identified] :  On examination of the right, patient has swelling, mild effusion and ecchymosis over the anterior aspect of the right knee.\par No erythema.\par Patient has painful range of motion to the right knee.\par Patient has pain when stressing the right knee, but no instability.\par Patient is able to actively straight leg raise.\par No significant tenderness when palpating over the mediolateral joint line.\par Patient is quite guarded with examination of the right knee possibly due to pain.\par Equivocal Umer's.\par Neurovascular intact.\par \par \par X-ray of the right knee negative for any acute fracture dislocation joint space well-preserved, mild joint effusion.\par \par \par On examination of the left knee, patient has tenderness when palpating over the anterior aspect of the knee.\par Nontender medial lateral joint line.\par No signs of instability.\par Negative Brendan.\par Good motor strength to knee flexion extension.\par Neurovascular intact.\par \par X-ray of the left knee negative for any acute fracture dislocation.\par Joint space well-preserved.\par \par \par On examination of the right foot and ankle, patient has some swelling and ecchymosis over the dorsal aspect of the foot.\par Patient has decreased range of motion to dorsiflexion and plantar flexion possibly due to pain and swelling.\par Patient has tenderness to palpation about the lateral ligaments and over the lateral aspect of the foot.\par Some discomfort on about the anterior aspect of the foot and ankle on about the talus.\par Nontender the medial or lateral malleolus.\par Nontender the deltoid ligament.\par No signs of instability about the foot and ankle.\par Nontender the Lisfranc.\par Calf is soft, nontender.\par \par X-ray of the right foot, there is a avulsion fracture over the talus this appears to be a chronic fracture, these is only seen over the lateral view.\par X-ray of the right ankle:  Negative for any acute fracture or dislocation except for the avulsion fracture over the talus previously noted on the x-ray of the right foot.

## 2022-07-27 NOTE — DISCUSSION/SUMMARY
[de-identified] :  RIGHT AND LEFT KNEE:\par   Patient was advised for compression with a Ace bandage or a knee support to the right knee.  \par Patient was advised for anti-inflammatories, prescription for diclofenac will be sent to the pharmacy.  \par Patient was advised to do activities as tolerated.  \par Patient was advised for a repeat follow-up in 3 weeks for repeat evaluation especially to the right knee.  \par If the right knee still has effusion and pain and may order an MRI of the right knee to rule out any internal derangement.\par \par   RIGHT FOOT AND ANKLE.\par   At this time impression is contusion and ankle sprain.  \par Patient was advised for the use of a tall Cam walker boot.  \par Patient was advised to follow up in about 2-3 weeks for repeat evaluation.  \par Patient was advised that if the pain on her foot and ankle improves she can transition into an ankle support.  \par Patient was advised to do weight-bearing as tolerated.  \par At this time patient may is doing protected weight-bearing with crutches and the boot.  \par Patient was given appointment to follow up in about 3 weeks.\par \par \par \par Supervising physician Dr. Garcia.\par

## 2022-08-03 ENCOUNTER — OUTPATIENT (OUTPATIENT)
Dept: OUTPATIENT SERVICES | Facility: HOSPITAL | Age: 47
LOS: 1 days | Discharge: HOME | End: 2022-08-03

## 2022-08-03 ENCOUNTER — RESULT REVIEW (OUTPATIENT)
Age: 47
End: 2022-08-03

## 2022-08-03 ENCOUNTER — APPOINTMENT (OUTPATIENT)
Dept: PODIATRY | Facility: CLINIC | Age: 47
End: 2022-08-03

## 2022-08-03 DIAGNOSIS — M79.671 PAIN IN RIGHT FOOT: ICD-10-CM

## 2022-08-03 PROCEDURE — 73610 X-RAY EXAM OF ANKLE: CPT | Mod: 26,RT

## 2022-08-03 PROCEDURE — 73630 X-RAY EXAM OF FOOT: CPT | Mod: 26,RT

## 2022-08-03 PROCEDURE — 99213 OFFICE O/P EST LOW 20 MIN: CPT

## 2022-08-03 NOTE — ASSESSMENT
[Verbal] : verbal [Patient] : patient [Good - alert, interested, motivated] : Good - alert, interested, motivated [Demonstrates independently] : demonstrates independently [Foot Care] : foot care [FreeTextEntry1] : Assessment:\par -Talus avulsion fx, Right foot\par \par -Plan:\par -Pt seen & evaluated\par -X rays reviewed - sings of healing - see report \par -WBAT  in CAM boot for one more week then transition into normal shoegear\par -Continue with RICE therapy\par -Disability form given, patient would like to return to work with light activity in CAM boot\par -Continue with light activity restrictions for 1 month before returning to full activity\par -RTC 4 weeks

## 2022-08-03 NOTE — HISTORY OF PRESENT ILLNESS
[Other: ____] : [unfilled] [Crutches] : crutches [FreeTextEntry1] : -47F c/o R foot fx from 7/4/2022. States she was standing on her deck, Left leg fell into a hole, and she twisted Right foot as a result.\par -Reports minor pain Right foot. \par -Dressings D/C/I\par -Reports pain and swelling have greatly improved since last visit \par -No other pedal complaints\par -Needs disability form filled out: sections were missing at last visit\par -New Xrays taken

## 2022-08-03 NOTE — PHYSICAL EXAM
[General Appearance - Alert] : alert [General Appearance - In No Acute Distress] : in no acute distress [General Appearance - Well Nourished] : well nourished [General Appearance - Well Developed] : well developed [Ankle Swelling On The Right] : of the right ankle [Varicose Veins Of The Right Leg] : on the right foot/ankle [2+] : right foot dorsalis pedis 2+ [Skin Lesions] : no skin lesions [Sensation] : the sensory exam was normal to light touch and pinprick [Oriented To Time, Place, And Person] : oriented to person, place, and time [Impaired Insight] : insight and judgment were intact [Affect] : the affect was normal [Ankle Swelling (On Exam)] : not present [Varicose Veins Of Lower Extremities] : not present [] : not present [Delayed in the Right Toes] : capillary refills normal in right toes [de-identified] : No gross skeletal deformities\par R Ankle ROM WNL \par Minimal Pain on palpation of the medial dorsal talar head R foot. No pain dorsiflexion and plantarflexion against resistance R foot\par TTP dorsomedial TN joint [Foot Ulcer] : no foot ulcer [Skin Induration] : no skin induration [FreeTextEntry1] : No Ecchymosis & edema of  midfoot\par No open wounds/lesions

## 2022-08-09 ENCOUNTER — OUTPATIENT (OUTPATIENT)
Dept: OUTPATIENT SERVICES | Facility: HOSPITAL | Age: 47
LOS: 1 days | Discharge: HOME | End: 2022-08-09

## 2022-08-09 ENCOUNTER — APPOINTMENT (OUTPATIENT)
Dept: PODIATRY | Facility: CLINIC | Age: 47
End: 2022-08-09

## 2022-08-09 DIAGNOSIS — S92.151A DISPLACED AVULSION FRACTURE (CHIP FRACTURE) OF RIGHT TALUS, INITIAL ENCOUNTER FOR CLOSED FRACTURE: ICD-10-CM

## 2022-08-09 DIAGNOSIS — S92.151D DISPLACED AVULSION FRACTURE (CHIP FRACTURE) OF RIGHT TALUS, SUBSEQUENT ENCOUNTER FOR FRACTURE WITH ROUTINE HEALING: ICD-10-CM

## 2022-08-09 PROCEDURE — 99213 OFFICE O/P EST LOW 20 MIN: CPT | Mod: GC

## 2022-08-09 NOTE — PHYSICAL EXAM
[General Appearance - Alert] : alert [General Appearance - In No Acute Distress] : in no acute distress [Ankle Swelling On The Right] : of the right ankle [Varicose Veins Of The Right Leg] : on the right foot/ankle [2+] : right foot dorsalis pedis 2+ [Skin Lesions] : no skin lesions [Sensation] : the sensory exam was normal to light touch and pinprick [Oriented To Time, Place, And Person] : oriented to person, place, and time [Impaired Insight] : insight and judgment were intact [Affect] : the affect was normal [Ankle Swelling (On Exam)] : not present [Varicose Veins Of Lower Extremities] : not present [] : not present [Delayed in the Right Toes] : capillary refills normal in right toes [de-identified] : No gross skeletal deformities\par R Ankle ROM WNL \par Minimal Pain on palpation of the medial dorsal talar head R foot. No pain dorsiflexion and plantarflexion against resistance R foot\par TTP dorsomedial TN joint [Foot Ulcer] : no foot ulcer [Skin Induration] : no skin induration [FreeTextEntry1] : No Ecchymosis & edema of  midfoot\par No open wounds/lesions

## 2022-08-09 NOTE — ASSESSMENT
[Verbal] : verbal [Patient] : patient [Good - alert, interested, motivated] : Good - alert, interested, motivated [Demonstrates independently] : demonstrates independently [Foot Care] : foot care [FreeTextEntry1] : Assessment:\par -Talus avulsion fx, Right foot\par \par -Plan:\par -Pt seen & evaluated\par -Patient may begin transitioning into normal shoegear\par -Continue with RICE therapy\par -Form completed to return to work; patient would like to return to work with light activity in CAM boot\par -Continue with light activity restrictions for 1 month before returning to full activity\par -RTC as needed ; patient may call in 4 week if symptoms do not continue to improve

## 2022-08-09 NOTE — HISTORY OF PRESENT ILLNESS
[Other: ____] : [unfilled] [Crutches] : crutches [FreeTextEntry1] : -47F c/o R foot fx from 7/4/2022. States she was standing on her deck, Left leg fell into a hole, and she twisted Right foot as a result.\par -Reports minor pain Right foot but only when weightbearing\par -Reports pain and swelling have continued to improve\par -No other pedal complaints\par \par Patient is here today to have form completed to return to work

## 2022-08-09 NOTE — END OF VISIT
[] : Resident [Time Spent: ___ minutes] : I have spent [unfilled] minutes of time on the encounter. [FreeTextEntry3] : paperwork filled out. pt ok to return work on modified work duty. Patient to limit standing/walking\par xrays reviewed\par follow up 3 weeks with Dr. Dillon

## 2022-08-24 ENCOUNTER — APPOINTMENT (OUTPATIENT)
Dept: ORTHOPEDIC SURGERY | Facility: CLINIC | Age: 47
End: 2022-08-24

## 2022-10-10 RX ORDER — DICLOFENAC SODIUM 1% 10 MG/G
1 GEL TOPICAL DAILY
Qty: 1 | Refills: 3 | Status: ACTIVE | COMMUNITY
Start: 2022-10-10 | End: 1900-01-01

## 2022-10-21 RX ORDER — LIDOCAINE 5% 700 MG/1
5 PATCH TOPICAL
Qty: 30 | Refills: 5 | Status: ACTIVE | COMMUNITY
Start: 2022-10-10

## 2024-07-10 NOTE — ED ADULT NURSE NOTE - NS ED PATIENT SAFETY CONCERN
Problem: Safety - Adult  Goal: Free from fall injury  7/10/2024 1404 by Blanca Shepherd, RN  Outcome: Adequate for Discharge  7/10/2024 0506 by Arleen Gonsalves, RN  Outcome: Progressing     
  Problem: Safety - Adult  Goal: Free from fall injury  Outcome: Progressing     
No

## 2025-03-31 NOTE — ED PROVIDER NOTE - WR INTERPRETATION 1
Please advise patient glucose levels look good   Is she taking insulin before meals ? How much and is she taking 10 min before or not     LMOM for patient to call office   + fx

## 2025-09-10 ENCOUNTER — APPOINTMENT (OUTPATIENT)
Dept: NEUROLOGY | Facility: CLINIC | Age: 50
End: 2025-09-10